# Patient Record
Sex: FEMALE | Race: BLACK OR AFRICAN AMERICAN | Employment: OTHER | URBAN - METROPOLITAN AREA
[De-identification: names, ages, dates, MRNs, and addresses within clinical notes are randomized per-mention and may not be internally consistent; named-entity substitution may affect disease eponyms.]

---

## 2017-07-26 ENCOUNTER — HOSPITAL ENCOUNTER (OUTPATIENT)
Dept: NUCLEAR MEDICINE | Age: 67
Discharge: HOME | End: 2017-07-26
Attending: NEUROLOGICAL SURGERY
Payer: MEDICARE

## 2017-07-26 ENCOUNTER — HOSPITAL ENCOUNTER (OUTPATIENT)
Dept: MRI IMAGING | Age: 67
Discharge: HOME | End: 2017-07-26
Attending: NEUROLOGICAL SURGERY
Payer: MEDICARE

## 2017-07-26 VITALS — BODY MASS INDEX: 43.05 KG/M2 | WEIGHT: 293 LBS

## 2017-07-26 DIAGNOSIS — M54.16 LUMBAR RADICULOPATHY: ICD-10-CM

## 2017-07-26 LAB — CREAT UR-MCNC: 0.8 MG/DL (ref 0.6–1.3)

## 2017-07-26 PROCEDURE — 72158 MRI LUMBAR SPINE W/O & W/DYE: CPT

## 2017-07-26 PROCEDURE — 78306 BONE IMAGING WHOLE BODY: CPT

## 2017-07-26 PROCEDURE — 74011250636 HC RX REV CODE- 250/636: Performed by: NEUROLOGICAL SURGERY

## 2017-07-26 PROCEDURE — A9585 GADOBUTROL INJECTION: HCPCS | Performed by: NEUROLOGICAL SURGERY

## 2017-07-26 PROCEDURE — 82565 ASSAY OF CREATININE: CPT

## 2017-07-26 RX ADMIN — GADOBUTROL 14 ML: 604.72 INJECTION INTRAVENOUS at 12:29

## 2017-09-28 PROBLEM — E11.9: Noted: 2017-09-28

## 2017-09-28 PROBLEM — H52.4: Noted: 2017-09-28

## 2017-09-28 PROBLEM — H40.033: Noted: 2017-09-28

## 2017-09-28 PROBLEM — H25.813: Noted: 2017-09-28

## 2019-11-21 ENCOUNTER — IMPORTED ENCOUNTER (OUTPATIENT)
Dept: URBAN - NONMETROPOLITAN AREA CLINIC 1 | Facility: CLINIC | Age: 69
End: 2019-11-21

## 2019-11-21 PROCEDURE — 99213 OFFICE O/P EST LOW 20 MIN: CPT

## 2019-11-21 NOTE — PATIENT DISCUSSION
Cataract(s)-Visually significant.-Cataract(s) causing symptomatic impairment of visual function not correctable with a tolerable change in glasses or contact lenses lighting or non-operative means resulting in specific activity limitations and/or participation restrictions including but not limited to reading viewing television driving or meeting vocational or recreational needs. -Expectation is clearer vision and reduced glare disability after cataract removal.-Refer to Dr Teo Jeronimo for cataract evaluationDM without Diabetic Retinopathy OU-Stressed the importance of keeping blood sugars under control and regular visits with PCP. -Explained the possible effects of poorly controlled diabetes and the damage that diabetes can cause to ocular health. -Pt instructed to contact our office with any vision changes. BUTCH OU-continue to monitor

## 2020-01-20 PROBLEM — R07.9 CHEST PAIN: Status: ACTIVE | Noted: 2020-01-20

## 2020-01-20 PROBLEM — R07.89 ATYPICAL CHEST PAIN: Status: ACTIVE | Noted: 2020-01-20

## 2020-01-20 PROBLEM — J44.1 COPD WITH EXACERBATION (HCC): Status: ACTIVE | Noted: 2020-01-20

## 2020-01-20 PROBLEM — J45.901 ASTHMA EXACERBATION: Status: ACTIVE | Noted: 2020-01-20

## 2020-03-05 PROBLEM — M46.1 SACROILIITIS, NOT ELSEWHERE CLASSIFIED (HCC): Status: ACTIVE | Noted: 2020-03-05

## 2020-06-08 ENCOUNTER — IMPORTED ENCOUNTER (OUTPATIENT)
Dept: URBAN - NONMETROPOLITAN AREA CLINIC 1 | Facility: CLINIC | Age: 70
End: 2020-06-08

## 2020-06-08 PROCEDURE — 92014 COMPRE OPH EXAM EST PT 1/>: CPT

## 2020-06-08 NOTE — PATIENT DISCUSSION
Cataract(s)-Visually significant cataract OU .-Cataract(s) causing symptomatic impairment of visual function not correctable with a tolerable change in glasses or contact lenses lighting or non-operative means resulting in specific activity limitations and/or participation restrictions including but not limited to reading viewing television driving or meeting vocational or recreational needs. -Expectation is clearer vision and functional improvement in symptoms as well as reduced glare disability after cataract removal.-Order IOLMaster and OPD today. -Recommend standard/traditional based on today's OPD testing and lifestyle questionnaire.-All questions were answered regarding surgery including pre and post-op medications appointments activity restrictions and anesthetic usage.-The risks benefits and alternatives and special risk factors for the patient were discussed in detail including but not limited to: bleeding infection retinal detachment vitreous loss problems with the implant and possible need for additional surgery.-Although rare the possibility of complete vision loss was discussed.-The possible need for glasses post-operatively was discussed.-Order medical clearance exam based on history of -Patient elects to proceed with cataract surgery OD . Will schedule at patient's convenience and re-evaluate OS  in the future. DM w/o DR-Stressed the importance of keeping blood sugars under control blood pressure under control and weight normalization and regular visits with PCP. -Explained the possible effects of poorly controlled diabetes and the damage that diabetes can cause to ocular health. -Patient to check HgbA1C.-Pt instructed to contact our office with any vision changes. Postop inflamation anticipated discussed Dextenza insertion after surgery.

## 2020-06-16 PROBLEM — H25.813: Noted: 2020-06-16

## 2020-06-16 PROBLEM — H40.033: Noted: 2020-06-16

## 2020-06-16 PROBLEM — E11.9: Noted: 2020-06-16

## 2020-06-17 ENCOUNTER — IMPORTED ENCOUNTER (OUTPATIENT)
Dept: URBAN - NONMETROPOLITAN AREA CLINIC 1 | Facility: CLINIC | Age: 70
End: 2020-06-17

## 2020-06-17 PROBLEM — Z96.1: Noted: 2021-01-27

## 2020-06-17 PROBLEM — Z01.818: Noted: 2020-06-17

## 2020-06-17 PROBLEM — H40.033: Noted: 2020-06-17

## 2020-06-17 PROBLEM — H25.813: Noted: 2020-06-17

## 2020-06-17 PROBLEM — E11.9: Noted: 2020-06-17

## 2020-06-17 PROBLEM — H26.491: Noted: 2021-01-27

## 2020-06-26 ENCOUNTER — IMPORTED ENCOUNTER (OUTPATIENT)
Dept: URBAN - NONMETROPOLITAN AREA CLINIC 1 | Facility: CLINIC | Age: 70
End: 2020-06-26

## 2020-06-26 PROBLEM — H40.033: Noted: 2020-06-17

## 2020-06-26 PROBLEM — H25.813: Noted: 2020-06-17

## 2020-06-26 PROBLEM — E11.9: Noted: 2020-06-17

## 2020-06-26 PROBLEM — Z98.41: Noted: 2020-06-26

## 2020-06-26 PROCEDURE — 99024 POSTOP FOLLOW-UP VISIT: CPT

## 2020-06-26 NOTE — PATIENT DISCUSSION
s/p PCIOL-Pt doing well s/p PCIOL. -Continue post-op gtts according to instruction sheet and sleep with eye shield over eye for 7 nights.-Avoid bending at the waist lifting anything over 5lbs and dirty or karl environments. -RTC 1 week POV/Reeval OS Dr. Jamie Brewer.

## 2020-07-01 ENCOUNTER — IMPORTED ENCOUNTER (OUTPATIENT)
Dept: URBAN - NONMETROPOLITAN AREA CLINIC 1 | Facility: CLINIC | Age: 70
End: 2020-07-01

## 2020-07-01 PROBLEM — H25.812: Noted: 2020-07-01

## 2020-07-01 PROBLEM — Z98.41: Noted: 2020-06-26

## 2020-07-01 PROBLEM — E11.9: Noted: 2020-06-17

## 2020-07-01 PROBLEM — H40.033: Noted: 2020-06-17

## 2020-07-01 PROBLEM — Z01.818: Noted: 2020-07-01

## 2020-07-01 PROCEDURE — 99024 POSTOP FOLLOW-UP VISIT: CPT

## 2020-07-01 NOTE — PATIENT DISCUSSION
Cataract(s)-Visually significant cataract OS . -Cataract(s) causing symptomatic impairment of visual function not correctable with a tolerable change in glasses or contact lenses lighting or non-operative means resulting in specific activity limitations and/or participation restrictions including but not limited to reading viewing television driving or meeting vocational or recreational needs. -Expectation is clearer vision and functional improvement in symptoms as well as reduced glare disability after cataract removal.-Recommend Stand/Trad based on previous OPD testing and lifestyle questionnaire.-All questions were answered regarding surgery including pre and post-op medications appointments activity restrictions and anesthetic usage.-The risks benefits and alternatives and special risk factors for the patient were discussed in detail including but not limited to: bleeding infection retinal detachment vitreous loss problems with the implant and possible need for additional surgery.-Although rare the possibility of complete vision loss was discussed.-The need for glasses post-operatively was discussed.-Patient elects to proceed with cataract surgery OS . s/p PCIOL-Pt doing well at 1 week s/p PCIOL. -Continue post-op gtts according to instruction sheet.-Okay to resume usual activites and d/c eye shield.

## 2020-07-14 ENCOUNTER — IMPORTED ENCOUNTER (OUTPATIENT)
Dept: URBAN - NONMETROPOLITAN AREA CLINIC 1 | Facility: CLINIC | Age: 70
End: 2020-07-14

## 2020-07-14 PROBLEM — Z98.42: Noted: 2020-07-14

## 2020-07-14 PROBLEM — E11.9: Noted: 2020-06-17

## 2020-07-14 PROBLEM — Z01.818: Noted: 2020-07-14

## 2020-07-14 PROCEDURE — 99024 POSTOP FOLLOW-UP VISIT: CPT

## 2020-07-14 NOTE — PATIENT DISCUSSION
"S/P CE w IOL OS 07/13/2020*PO Cataract extraction:.-	  The pt has undergone successful cataract extraction with Intraocular lens implantation in the left eye.-	  PO examination is normal and visual acuity has improved. -	  Instructed patient not to rub the eye and don't go swimming. -	  Pt should return in 1 week for follow up. -	  Ocular meds plan discussed and patient received printed take home instructions.""s/p PCIOL-Pt doing well s/p PCIOL. -Continue post-op gtts according to instruction sheet and sleep with eye shield over eye for 7 nights.-Avoid bending at the waist lifting anything over 5lbs and dirty or karl environments. -RTC . IOP in OS 36  put drop of Combigan in OS at 8:38 AM"

## 2020-07-20 ENCOUNTER — IMPORTED ENCOUNTER (OUTPATIENT)
Dept: URBAN - NONMETROPOLITAN AREA CLINIC 1 | Facility: CLINIC | Age: 70
End: 2020-07-20

## 2020-07-20 PROCEDURE — 99024 POSTOP FOLLOW-UP VISIT: CPT

## 2020-08-03 ENCOUNTER — IMPORTED ENCOUNTER (OUTPATIENT)
Dept: URBAN - NONMETROPOLITAN AREA CLINIC 1 | Facility: CLINIC | Age: 70
End: 2020-08-03

## 2020-08-03 PROCEDURE — 99024 POSTOP FOLLOW-UP VISIT: CPT

## 2021-01-27 ENCOUNTER — IMPORTED ENCOUNTER (OUTPATIENT)
Dept: URBAN - NONMETROPOLITAN AREA CLINIC 1 | Facility: CLINIC | Age: 71
End: 2021-01-27

## 2021-01-27 PROCEDURE — 92012 INTRM OPH EXAM EST PATIENT: CPT

## 2021-02-04 ENCOUNTER — IMPORTED ENCOUNTER (OUTPATIENT)
Dept: URBAN - NONMETROPOLITAN AREA CLINIC 1 | Facility: CLINIC | Age: 71
End: 2021-02-04

## 2021-02-04 PROCEDURE — 66821 AFTER CATARACT LASER SURGERY: CPT

## 2022-02-02 ENCOUNTER — PREPPED CHART (OUTPATIENT)
Dept: RURAL CLINIC 2 | Facility: CLINIC | Age: 72
End: 2022-02-02

## 2022-02-03 ENCOUNTER — ESTABLISHED PATIENT (OUTPATIENT)
Dept: RURAL CLINIC 2 | Facility: CLINIC | Age: 72
End: 2022-02-03

## 2022-02-03 DIAGNOSIS — H26.492: ICD-10-CM

## 2022-02-03 DIAGNOSIS — H52.4: ICD-10-CM

## 2022-02-03 DIAGNOSIS — E11.9: ICD-10-CM

## 2022-02-03 DIAGNOSIS — Z96.1: ICD-10-CM

## 2022-02-03 DIAGNOSIS — H40.023: ICD-10-CM

## 2022-02-03 DIAGNOSIS — H16.223: ICD-10-CM

## 2022-02-03 DIAGNOSIS — G45.9: ICD-10-CM

## 2022-02-03 PROCEDURE — 92015 DETERMINE REFRACTIVE STATE: CPT

## 2022-02-03 PROCEDURE — 92014 COMPRE OPH EXAM EST PT 1/>: CPT

## 2022-02-03 ASSESSMENT — VISUAL ACUITY
OD_CC: 20/20-2
OS_CC: 20/20

## 2022-02-03 ASSESSMENT — TONOMETRY
OS_IOP_MMHG: 24
OD_IOP_MMHG: 23

## 2022-04-09 ASSESSMENT — VISUAL ACUITY
OD_GLARE: 20/50
OD_PAM: 20/25
OS_SC: 20/20
OD_CC: 20/25
OS_GLARE: 20/30
OS_GLARE: 20/30
OD_CC: 20/40+
OS_SC: 20/20
OS_SC: 20/25
OS_AM: 20/25
OD_CC: J1
OS_AM: 20/25
OS_CC: 20/30
OD_CC: 20/50
OS_CC: J1
OS_CC: 20/30
OS_CC: 20/40
OD_SC: 20/40
OS_SC: 20/30
OD_GLARE: 20/50
OD_CC: J5
OD_CC: 20/25-1
OD_GLARE: 20/50
OS_PH: 20/25-2
OD_SC: 20/50+2
OD_SC: 20/40
OS_GLARE: 20/50
OD_SC: 20/30
OS_GLARE: 20/60
OS_CC: J2

## 2022-04-09 ASSESSMENT — TONOMETRY
OD_IOP_MMHG: 20
OS_IOP_MMHG: 22
OS_IOP_MMHG: 20
OD_IOP_MMHG: 22
OS_IOP_MMHG: 20
OS_IOP_MMHG: 20
OS_IOP_MMHG: 21
OD_IOP_MMHG: 19
OD_IOP_MMHG: 20
OD_IOP_MMHG: 20
OS_IOP_MMHG: 36
OS_IOP_MMHG: 15
OD_IOP_MMHG: 16
OD_IOP_MMHG: 20
OD_IOP_MMHG: 20
OS_IOP_MMHG: 20